# Patient Record
Sex: MALE | Race: WHITE | NOT HISPANIC OR LATINO | Employment: STUDENT | RURAL
[De-identification: names, ages, dates, MRNs, and addresses within clinical notes are randomized per-mention and may not be internally consistent; named-entity substitution may affect disease eponyms.]

---

## 2022-01-20 ENCOUNTER — OFFICE VISIT (OUTPATIENT)
Dept: FAMILY MEDICINE | Facility: CLINIC | Age: 22
End: 2022-01-20
Payer: COMMERCIAL

## 2022-01-20 VITALS
HEIGHT: 74 IN | RESPIRATION RATE: 20 BRPM | HEART RATE: 87 BPM | WEIGHT: 205 LBS | OXYGEN SATURATION: 98 % | BODY MASS INDEX: 26.31 KG/M2 | DIASTOLIC BLOOD PRESSURE: 74 MMHG | SYSTOLIC BLOOD PRESSURE: 118 MMHG

## 2022-01-20 DIAGNOSIS — R51.9 NONINTRACTABLE HEADACHE, UNSPECIFIED CHRONICITY PATTERN, UNSPECIFIED HEADACHE TYPE: ICD-10-CM

## 2022-01-20 DIAGNOSIS — R10.9 STOMACH PAIN: ICD-10-CM

## 2022-01-20 DIAGNOSIS — R10.10 UPPER ABDOMINAL PAIN: ICD-10-CM

## 2022-01-20 DIAGNOSIS — U07.1 COVID-19: Primary | ICD-10-CM

## 2022-01-20 DIAGNOSIS — R11.0 NAUSEA: ICD-10-CM

## 2022-01-20 DIAGNOSIS — R51.9 PERSISTENT HEADACHES: ICD-10-CM

## 2022-01-20 LAB
CTP QC/QA: YES
SARS-COV-2 AG RESP QL IA.RAPID: POSITIVE

## 2022-01-20 PROCEDURE — 99202 PR OFFICE/OUTPT VISIT, NEW, LEVL II, 15-29 MIN: ICD-10-PCS | Mod: ,,, | Performed by: NURSE PRACTITIONER

## 2022-01-20 PROCEDURE — 87426 SARS CORONAVIRUS 2 ANTIGEN POCT: ICD-10-PCS | Mod: QW,,, | Performed by: NURSE PRACTITIONER

## 2022-01-20 PROCEDURE — 99202 OFFICE O/P NEW SF 15 MIN: CPT | Mod: ,,, | Performed by: NURSE PRACTITIONER

## 2022-01-20 PROCEDURE — 87426 SARSCOV CORONAVIRUS AG IA: CPT | Mod: QW,,, | Performed by: NURSE PRACTITIONER

## 2022-01-20 NOTE — PATIENT INSTRUCTIONS
Patient Education       COVID-19 Discharge Instructions   About this topic   Coronavirus disease 2019 is also known as COVID-19. It is a viral illness that infects the lungs. It is caused by a virus called SARS-associated coronavirus (SARS-CoV-2).  The signs of COVID-19 most often start a few days after you have been infected. In some people, it takes longer to show signs. Others never show signs of the infection. You may have a cough, fever, shaking chills and it may be hard to breathe. You may be very tired, have muscle aches, a headache or sore throat. Some people have an upset stomach or loose stools. Others lose their sense of smell or taste. You may not have these signs all the time and they may come and go while you are sick.  The virus spreads easily through droplets when you talk, sneeze, or cough. You can pass the virus to others when you are talking close together, singing, hugging, sharing food, or shaking hands. Doctors believe the germs also survive on surfaces like tables, door handles, and telephones. However, this is not a common way that COVID-19 spreads. Doctors believe you can also spread the infection even if you dont have any symptoms, but they do not know how that happens. This is why getting vaccinated is one of the best ways to keep you healthy and slow the spread of the virus.  Some people have a mild case of COVID-19 and are able to stay at home and away from others until they feel better. Others may need to be in the hospital if they are very sick. Some people with COVID-19 can have some symptoms for weeks or months. People with COVID-19 must isolate themselves. You can start to be around others when your doctor says it is safe to do so.       What care is needed at home?   · Ask your doctor what you need to do when you go home. Make sure you ask questions if you do not understand what the doctor says.  · Drink lots of water, juice, or broth to replace fluids lost from a fever.  · You  may use cool mist humidifiers to help ease congestion and coughing.  · Use 2 to 3 pillows to prop yourself up when you lie down to make it easier to breathe and sleep.  · Do not smoke and do not drink beer, wine, and mixed drinks (alcohol).  · To lower the chance of passing the infection to others, get a COVID-19 vaccine after your infection has resolved.  · If you have not been fully vaccinated:  ? Wear a mask over your mouth and nose if you are around others who are not sick. Cloth masks work best if they have more than one layer of fabric.  ? Wash your hands often.  ? Stay home in a separate room, if possible, away from others. Only go out to get medical care.  ? Use a separate bathroom if possible.  ? Do not make food for others.  What follow-up care is needed?   · Your doctor may ask you to make visits to the office to check on your progress. Be sure to keep these visits. Make sure you wear a mask at these visits.  · If you can, tell the staff you have COVID-19 ahead of time so they can take extra care to stop the disease from spreading.  · It may take a few weeks before your health returns to normal.  What drugs may be needed?   The doctor may order drugs to:  · Help with breathing  · Help with fever  · Help with swelling in your airways and lungs  · Control coughing  · Ease a sore throat  · Help a runny or stuffy nose  Will physical activity be limited?   You may have to limit your physical activity. Talk to your doctor about the right amount of activity for you. If you have been very sick with COVID-19, it can take some time to get your strength back.  Will there be any other care needed?   Doctors do not know how long you can pass the virus on to others after you are sick. This is why it is important to stay in a separate room, if possible, when you are sick. For now, doctors are giving general guidelines for you to follow after you have been sick. Before you go around other people, you should:  · Be fever  free for 24 hours without taking any drugs to lower the fever  · Have no symptoms of cough or shortness of breath  · Wait at least 10 days after first having symptoms or your first positive test, and you need to be symptom free as above. Some experts suggest waiting 20 days if you have had a more severe infection.  Talk with your doctor about getting a COVID-19 vaccine.  What problems could happen?   · Fluid loss. This is dehydration.  · Short-term or long-term lung damage  · Heart problems  · Death  When do I need to call the doctor?   · You are having so much trouble breathing that you can only say one or two words at a time.  · You need to sit upright at all times to be able to breathe and/or cannot lie down.  · You are very confused or cannot stay awake.  · Your lips or skin start to turn blue or grey.  · You think you might be having a medical emergency. Some examples of medical emergencies are:  ? Severe chest pain.  ? Not able to speak or move normally.  · You have trouble breathing when talking or sitting still.  · You have new shortness of breath.  · You become weak or dizzy.  · You have very dark urine or do not pass urine for more than 8 hours.  · You have new or worsening COVID-19 symptoms like:  ? Fever  ? Cough  ? Feeling very tired  ? Shaking chills  ? Headache  ? Trouble swallowing  ? Throwing up  ? Loose stools  ? Reddish purple spots on your fingers or toes  Teach Back: Helping You Understand   The Teach Back Method helps you understand the information we are giving you. After you talk with the staff, tell them in your own words what you learned. This helps to make sure the staff has described each thing clearly. It also helps to explain things that may have been confusing. Before going home, make sure you can do these:  · I can tell you about my condition.  · I can tell you what may help ease my breathing.  · I can tell you what I can do to help avoid passing the infection to others.  · I can tell  you what I will do if I have trouble breathing; feel sleepy or confused; or my fingertips, fingernails, skin, or lips are blue.  Where can I learn more?   Centers for Disease Control and Prevention  https://www.cdc.gov/coronavirus/2019-ncov/about/index.html   Centers for Disease Control and Prevention  https://www.cdc.gov/coronavirus/2019-ncov/hcp/disposition-in-home-patients.html   World Health Organization  https://www.who.int/news-room/q-a-detail/e-c-wrwamcwelveee   Last Reviewed Date   2021-10-05  Consumer Information Use and Disclaimer   This information is not specific medical advice and does not replace information you receive from your health care provider. This is only a brief summary of general information. It does NOT include all information about conditions, illnesses, injuries, tests, procedures, treatments, therapies, discharge instructions or life-style choices that may apply to you. You must talk with your health care provider for complete information about your health and treatment options. This information should not be used to decide whether or not to accept your health care providers advice, instructions or recommendations. Only your health care provider has the knowledge and training to provide advice that is right for you.  Copyright   Copyright © 2021 [employer] and its affiliates and/or licensors. All rights reserved.

## 2022-01-20 NOTE — PROGRESS NOTES
[facility] - FAMILY [facility]IC[facility]       PATIENT NAME: Amado Martins   : 2000    AGE: 21 y.o. DATE: 2022    MRN: [id number]        Reason for Visit / Chief Complaint:  URI (Diarrhea, SOB, wheezing, chest pain, nonproductive cough, headaches, congestion, drainage, ear pain, loss of taste x 2 days. No fever. Unvaccinated. No known exposure.//Chest pain has been constant for the past 2-3 weeks.) and Abdominal Pain (Pt has been having abdominal pain and nausea since November. This was their main complaint today- States he was very stressed at school and is concerned about stomach ulcers.)     Subjective:     Presents with mom for car visit due to COVID-19 concerns and screening guidelines. Chief complaint reviewed. C/o upper resp symptoms as indicated in ROS x 2 days, but c/o upper abd pain, headaches (pressure behind eyes), and nausea x approx 2 mths. Stressed at school - tough classes at [employer] last semester. Started taking prilosec OTC 3 days ago - has appt here 22 for these complaints.    Review of Systems:    Review of Systems   Constitutional: Negative for appetite change, chills, fatigue and fever.   HENT: Positive for congestion, ear pain and postnasal drip. Negative for sinus pain and sore throat.    Respiratory: Positive for cough. Negative for shortness of breath and wheezing.    Cardiovascular: Negative for chest pain.   Gastrointestinal: Positive for abdominal pain, diarrhea and nausea. Negative for vomiting.   Musculoskeletal: Negative for myalgias.   Skin: Negative.    Neurological: Positive for headaches.        Review of patient's allergies indicates:  No Known Allergies     Med List:  No current outpatient medications on file prior to visit.     No current facility-administered medications on file prior to visit.       Medical/Social/Family History:  History reviewed. No pertinent past medical history.   Social History     Tobacco Use  "  Smoking Status Never Smoker   Smokeless Tobacco Never Used        There is no immunization history on file for this patient.     Objective:      Vitals:    01/20/22 1153   BP: 118/74   BP Location: Right arm   Patient Position: Sitting   BP Method: Large (Manual)   Pulse: 87   Resp: 20   SpO2: 98%   Weight: 93 kg (205 lb)  Comment: Pt estimated   Height: 6' 2" (1.88 m)     Body mass index is 26.32 kg/m².     Physical Exam:    Physical Exam  Vitals and nursing note reviewed.   Constitutional:       General: He is not in acute distress.     Appearance: Normal appearance. He is not ill-appearing.   HENT:      Head: Normocephalic.   Eyes:      Conjunctiva/sclera: Conjunctivae normal.   Cardiovascular:      Rate and Rhythm: Normal rate.   Pulmonary:      Effort: Pulmonary effort is normal. No respiratory distress.   Abdominal:      Tenderness: There is no abdominal tenderness.   Skin:     Coloration: Skin is not pale.   Neurological:      Mental Status: He is alert and oriented to person, place, and time.         Assessment:          ICD-10-CM ICD-9-CM   1. COVID-19  U07.1 079.89   2. Nonintractable headache, unspecified chronicity pattern, unspecified headache type  R51.9 784.0   3. Stomach pain  R10.9 536.8   4. Nausea  R11.0 787.02   5. Upper abdominal pain  R10.10 789.09   6. Persistent headaches  R51.9 784.0        Plan:       COVID-19    Nonintractable headache, unspecified chronicity pattern, unspecified headache type  -     SARS Coronavirus 2 Antigen, POCT    Stomach pain  -     SARS Coronavirus 2 Antigen, POCT    Nausea  -     SARS Coronavirus 2 Antigen, POCT    Upper abdominal pain    Persistent headaches      No current outpatient medications on file.      Requested Prescriptions      No prescriptions requested or ordered in this encounter       Rapid COVID POSITIVE     Advised to quarantine per updated CDC guidelines.  Advised COVID-19 is a virus and viruses do not respond to antibiotics nor do antibiotics " prevent a bacterial infection from developing.   Advised there is still some delta variant COVID-19 circulating, but the Omicron variant seems to be rapidly increasing in the area and tends to have milder symptoms that are shorter in duration.     Advised of the nationwide shortage of COVID MABs, and the very limited supply for very high risk patient's only.   Risk of COVID-19 Complications score: 1     Treatment for COVID-19 is primarily supportive including rest, increase fluids, and the use of OTC meds to help alleviate symptoms.    For immune support take vitamin C, vitamin D, and zinc 50 mg daily.  Acetaminophen or ibuprofen as needed for body aches, headache, or fever.  Home O2 sat monitoring can be used if experiencing shortness of breath.   Advised to report to ED for evaluation of any new or worsening chest pain, shortness of breath, or other severe symptoms.  Call the office if you have other questions or concerns that arise.    Stay on prilosec OTC and keep appt 2/4/22 as scheduled.  F/u as needed or if symptoms worsen or persist.    Signature: Yulia RICE-BC

## 2022-01-20 NOTE — LETTER
January 20, 2022      [facility] - Family Medicine  [address] [place], [place] [address]E [facility]  [place] [address]  Phone: [phone]  Fax: [phone]       Patient: Amado Martins   YOB: 2000  Date of Visit: 01/20/2022    To Whom It May Concern:    Toy Martins  was at [facility] on 01/20/2022. The patient may return to work/school on 01/25/2022 with no restrictions. If you have any questions or concerns, or if I can be of further assistance, please do not hesitate to contact me.    Sincerely,    KRYSTAL Moncada

## 2022-02-08 ENCOUNTER — OFFICE VISIT (OUTPATIENT)
Dept: FAMILY MEDICINE | Facility: CLINIC | Age: 22
End: 2022-02-08
Payer: COMMERCIAL

## 2022-02-08 VITALS
HEART RATE: 76 BPM | RESPIRATION RATE: 20 BRPM | SYSTOLIC BLOOD PRESSURE: 124 MMHG | HEIGHT: 74 IN | TEMPERATURE: 98 F | DIASTOLIC BLOOD PRESSURE: 72 MMHG | WEIGHT: 208.81 LBS | OXYGEN SATURATION: 98 % | BODY MASS INDEX: 26.8 KG/M2

## 2022-02-08 DIAGNOSIS — R53.83 OTHER FATIGUE: ICD-10-CM

## 2022-02-08 DIAGNOSIS — Z13.220 SCREENING FOR HYPERLIPIDEMIA: ICD-10-CM

## 2022-02-08 DIAGNOSIS — Z11.59 NEED FOR HEPATITIS C SCREENING TEST: ICD-10-CM

## 2022-02-08 DIAGNOSIS — R63.5 WEIGHT GAIN: ICD-10-CM

## 2022-02-08 DIAGNOSIS — R10.10 UPPER ABDOMINAL PAIN: ICD-10-CM

## 2022-02-08 DIAGNOSIS — Z00.00 ROUTINE GENERAL MEDICAL EXAMINATION AT A HEALTH CARE FACILITY: Primary | ICD-10-CM

## 2022-02-08 PROBLEM — U07.1 COVID-19: Status: RESOLVED | Noted: 2022-01-20 | Resolved: 2022-02-08

## 2022-02-08 PROCEDURE — 3008F PR BODY MASS INDEX (BMI) DOCUMENTED: ICD-10-PCS | Mod: ,,, | Performed by: NURSE PRACTITIONER

## 2022-02-08 PROCEDURE — 1159F MED LIST DOCD IN RCRD: CPT | Mod: ,,, | Performed by: NURSE PRACTITIONER

## 2022-02-08 PROCEDURE — 3074F PR MOST RECENT SYSTOLIC BLOOD PRESSURE < 130 MM HG: ICD-10-PCS | Mod: ,,, | Performed by: NURSE PRACTITIONER

## 2022-02-08 PROCEDURE — 3008F BODY MASS INDEX DOCD: CPT | Mod: ,,, | Performed by: NURSE PRACTITIONER

## 2022-02-08 PROCEDURE — 99395 PR PREVENTIVE VISIT,EST,18-39: ICD-10-PCS | Mod: ,,, | Performed by: NURSE PRACTITIONER

## 2022-02-08 PROCEDURE — 3078F PR MOST RECENT DIASTOLIC BLOOD PRESSURE < 80 MM HG: ICD-10-PCS | Mod: ,,, | Performed by: NURSE PRACTITIONER

## 2022-02-08 PROCEDURE — 3078F DIAST BP <80 MM HG: CPT | Mod: ,,, | Performed by: NURSE PRACTITIONER

## 2022-02-08 PROCEDURE — 3074F SYST BP LT 130 MM HG: CPT | Mod: ,,, | Performed by: NURSE PRACTITIONER

## 2022-02-08 PROCEDURE — 99395 PREV VISIT EST AGE 18-39: CPT | Mod: ,,, | Performed by: NURSE PRACTITIONER

## 2022-02-08 PROCEDURE — 1159F PR MEDICATION LIST DOCUMENTED IN MEDICAL RECORD: ICD-10-PCS | Mod: ,,, | Performed by: NURSE PRACTITIONER

## 2022-02-08 RX ORDER — OMEPRAZOLE 20 MG/1
20 CAPSULE, DELAYED RELEASE ORAL DAILY
COMMUNITY
End: 2022-05-04 | Stop reason: DRUGHIGH

## 2022-02-08 NOTE — PATIENT INSTRUCTIONS
Patient Education       Yearly Physical for Adults   About this topic   Most people do not want to be sick. Having a checkup each year with your doctor is one way to help you stay healthy. You may need to see your doctor more or less often. How often you need to go to the doctor depends on your age. Your family and medical history also play a role in how often you need to go to the doctor. Going to see your doctor on a routine basis can help you find problems early or even before they start. This may make it easier to treat or cure your problem.  General   Your doctor will talk about many things during your checkup. Your doctor may ask about:  · Your medical and family history.  · All the drugs you are taking. Be sure to include all prescription, over the counter, and herbal supplements. Tell the doctor if you have any drug allergy. Bring a list of drugs you take with you.  · How you are feeling and if you are having any problems.  · Risky behaviors like smoking, drinking alcohol, using illegal drugs, not wearing seatbelts, having unprotected sex, etc.  Your doctor will do a physical exam and may check your:  · Height and weight  · Blood pressure  · Reflexes  · Memory  · Vision  · Hearing  Your doctor may order:  · Lab tests  · ECG to check your heart rhythm  · X-rays  · Tests or treatments based on your exam  What lifestyle changes are needed?   Your doctor may suggest you make changes to your lifestyle at this visit. The doctor may talk with you about being more active or lowering stress levels. Ask your doctor what you need to do.  What drugs may be needed?   Your doctor may order drugs or vaccines to protect you from illnesses.  What changes to diet are needed?   Talk to your doctor to see if any changes are needed to your diet.  When do I need to call the doctor?   Call your doctor if you need to learn about any test results. Together you can make a plan for more care.  Helpful tips   · Make a list of questions  for your doctor before you go. This will help you remember to ask about any concerns. Write down any answers from your doctor so you can look over them after your visit.   · Tell your doctor about any changes in your body or health since your last visit.  · Ask your doctor about any screening tests you need.  Where can I learn more?   American Academy of Family Physicians  http://familydoctor.org/familydoctor/en/prevention-wellness/staying-healthy/healthy-living/preventive-services-for-healthy-living.printerview.html   Centers for Disease Control  http://www.cdc.gov/family/checkup/   Last Reviewed Date   2019-04-22  Consumer Information Use and Disclaimer   This information is not specific medical advice and does not replace information you receive from your health care provider. This is only a brief summary of general information. It does NOT include all information about conditions, illnesses, injuries, tests, procedures, treatments, therapies, discharge instructions or life-style choices that may apply to you. You must talk with your health care provider for complete information about your health and treatment options. This information should not be used to decide whether or not to accept your health care providers advice, instructions or recommendations. Only your health care provider has the knowledge and training to provide advice that is right for you.  Copyright   Copyright © 2021 [employer] and its affiliates and/or licensors. All rights reserved.

## 2022-02-08 NOTE — PROGRESS NOTES
"   [facility] - FAMILY MEDICINE       PATIENT NAME: Amado Martins   : 2000    AGE: 21 y.o. DATE: 02/15/2022    MRN: [id number]        Reason for Visit / Chief Complaint:  Annual Exam (Pt presents for [facility]. He is not fasting.)     Subjective:     HPI:  Presents to establish care/healthy you. Will RTC Thurs am for fasting labs.  Positive for COVID 2.5 weeks ago.    Was previously having epigastric pain that radiated into his chest and nausea, but reports that has improved since starting omeprazole 20 mg about 3 weeks ago.   Now just some nausea in the mornings that resolves without treatment as he gets busy and doesn't notice it anymore.   Still has some random discomfort to upper abd - RUQ and LUQ at times.    Was having diarrhea which stopped 2 weeks ago and now having a normal daily BM.    Had some liquor over the weekend and his low back began aching. Low back pain on R side flares up off and on.    ETOH 3-4 times per week - usually 3-4 beer, but occasionally liquor   No substance abuse.    Headaches - started in November, minor at first, last week headaches were "terrible", but improved  and only mild discomfort for 5 minutes yesterday and today.  "brain fog" and fatigue x approx 3 mths  20 lb wt gain in past 2-3 mths    Thought maybe s/s were stress related, but denies anxiety and depression.    Dark circles under eyes whole life    PHQ9 2022   Total Score 5       Review of Systems:     Review of Systems   Constitutional: Positive for fatigue and unexpected weight change. Negative for appetite change, chills and fever.   HENT: Negative.    Eyes:        Dry eyes  No contacts or glasses.   Respiratory: Negative.    Cardiovascular: Negative.    Gastrointestinal: Positive for abdominal pain and nausea. Negative for blood in stool, constipation, diarrhea and vomiting.   Genitourinary: Positive for frequency. Negative for dysuria, penile " discharge, penile swelling, scrotal swelling and testicular pain.   Skin: Negative.    Neurological: Positive for headaches.   Psychiatric/Behavioral: Negative.  Negative for dysphoric mood. The patient is not nervous/anxious.        Allergies and Medications:     Review of patient's allergies indicates:  No Known Allergies     Current Outpatient Medications on File Prior to Visit   Medication Sig Dispense Refill    omeprazole (PRILOSEC) 20 MG capsule Take 20 mg by mouth once daily.       No current facility-administered medications on file prior to visit.       Labs:   None in Epic prior to today's visit.    Medical/Social/Family History:     Past Medical History:   Diagnosis Date    COVID-19 1/20/2022    GERD (gastroesophageal reflux disease)       Social History     Tobacco Use   Smoking Status Never Smoker   Smokeless Tobacco Never Used      Social History     Substance and Sexual Activity   Alcohol Use Yes       Family History   Problem Relation Age of Onset    No Known Problems Mother     GI problems Father     No Known Problems Maternal Grandmother     No Known Problems Maternal Grandfather     No Known Problems Paternal Grandmother     No Known Problems Paternal Grandfather       Past Surgical History:   Procedure Laterality Date    BONE GRAFT      TONSILLECTOMY          Health Maintenance:     Immunization History   Administered Date(s) Administered    DTaP 02/25/1999, 05/04/1999, 07/07/1999, 02/12/2003    DTaP / HiB 03/24/2000    Hep B / HiB 05/04/1999    Hepatitis B, Pediatric/Adolescent 02/25/1999, 07/07/1999    HiB PRP-OMP 02/25/1999    IPV 02/25/1999, 05/04/1999, 03/24/2000, 02/12/2003    MMR 03/24/2000, 02/12/2003    Meningococcal Conjugate (MCV4P) 10/19/2018    Tdap 10/19/2018    Varicella 03/24/2000      Health Maintenance Due   Topic Date Due    Eye Exam  Never done    HPV Vaccines (1 - Male 2-dose series) Never done     Health Maintenance Topics with due status: Not Due     "   Topic Last Completion Date    TETANUS VACCINE 10/19/2018       Objective:      Vitals:    02/08/22 1349   BP: 124/72   BP Location: Right arm   Patient Position: Sitting   BP Method: Large (Manual)   Pulse: 76   Resp: 20   Temp: 98.3 °F (36.8 °C)   TempSrc: Temporal   SpO2: 98%   Weight: 94.7 kg (208 lb 12.8 oz)   Height: 6' 2" (1.88 m)     Body mass index is 26.81 kg/m².     Physical Exam:    Physical Exam  Vitals and nursing note reviewed.   Constitutional:       Appearance: Normal appearance.   HENT:      Head: Normocephalic.      Right Ear: Tympanic membrane, ear canal and external ear normal.      Left Ear: Tympanic membrane, ear canal and external ear normal.      Nose: Nose normal.      Mouth/Throat:      Mouth: Mucous membranes are moist.      Pharynx: Oropharynx is clear.   Eyes:      Conjunctiva/sclera: Conjunctivae normal.      Pupils: Pupils are equal, round, and reactive to light.   Neck:      Thyroid: No thyromegaly.      Vascular: Normal carotid pulses. No carotid bruit.   Cardiovascular:      Rate and Rhythm: Normal rate and regular rhythm.      Pulses: Normal pulses.      Heart sounds: Normal heart sounds.   Pulmonary:      Effort: Pulmonary effort is normal.      Breath sounds: Normal breath sounds.   Abdominal:      General: Bowel sounds are normal.      Palpations: Abdomen is soft.      Tenderness: There is no abdominal tenderness.   Musculoskeletal:      Cervical back: Neck supple.      Right lower leg: No edema.      Left lower leg: No edema.   Lymphadenopathy:      Cervical: No cervical adenopathy.   Skin:     General: Skin is warm and dry.   Neurological:      General: No focal deficit present.      Mental Status: He is alert and oriented to person, place, and time.   Psychiatric:         Mood and Affect: Mood normal.         Behavior: Behavior normal.          Assessment:          ICD-10-CM ICD-9-CM   1. Routine general medical examination at a health care facility  Z00.00 V70.0   2. " Screening for hyperlipidemia  Z13.220 V77.91   3. Need for hepatitis C screening test  Z11.59 V73.89   4. Other fatigue  R53.83 780.79   5. Weight gain  R63.5 783.1   6. Upper abdominal pain  R10.10 789.09        Plan:       Routine general medical examination at a health care facility    Screening for hyperlipidemia  -     Lipid Panel; Future; Expected date: 02/10/2022    Need for hepatitis C screening test  -     Hepatitis C Antibody; Future; Expected date: 02/10/2022    Other fatigue  -     CBC Auto Differential; Future; Expected date: 02/10/2022  -     Comprehensive Metabolic Panel; Future; Expected date: 02/10/2022  -     TSH; Future; Expected date: 02/10/2022    Weight gain  -     CBC Auto Differential; Future; Expected date: 02/10/2022  -     Comprehensive Metabolic Panel; Future; Expected date: 02/10/2022  -     TSH; Future; Expected date: 02/10/2022    Upper abdominal pain  -     CBC Auto Differential; Future; Expected date: 02/10/2022  -     Comprehensive Metabolic Panel; Future; Expected date: 02/10/2022        Current Outpatient Medications:     omeprazole (PRILOSEC) 20 MG capsule, Take 20 mg by mouth once daily., Disp: , Rfl:       New & refilled meds:  Requested Prescriptions      No prescriptions requested or ordered in this encounter     Health goals to improve overall health and well-being:  healthy diet, exercise at least 5 days per week  fasting glucose < 100  LDL chol < 100    Additional labs for work-up of other s/s then further diagnostic studies if needed or if s/s persist.  Continue omeprazole 20 mg daily.     Encourage healthy diet, exercise, approx 100 oz water daily.    Next HY in 1 yr.   F/u for further eval if labs ok but s/s persist.    Future Appointments   Date Time Provider Department Center   2/15/2022  1:20 PM KRYSTAL Powers [facility]   2/13/2023  8:00 AM KRYSTAL Powers [facility]        Signature:  KRYSTAL PowersH  [facility] - FAMILY MEDICINE    Date of encounter: 2/8/22

## 2022-02-15 ENCOUNTER — HOSPITAL ENCOUNTER (OUTPATIENT)
Dept: RADIOLOGY | Facility: HOSPITAL | Age: 22
Discharge: HOME | End: 2022-02-15
Attending: NURSE PRACTITIONER
Payer: COMMERCIAL

## 2022-02-15 ENCOUNTER — OFFICE VISIT (OUTPATIENT)
Dept: FAMILY MEDICINE | Facility: CLINIC | Age: 22
End: 2022-02-15
Payer: COMMERCIAL

## 2022-02-15 VITALS
TEMPERATURE: 99 F | WEIGHT: 209.38 LBS | RESPIRATION RATE: 20 BRPM | DIASTOLIC BLOOD PRESSURE: 78 MMHG | HEIGHT: 74 IN | HEART RATE: 68 BPM | OXYGEN SATURATION: 98 % | BODY MASS INDEX: 26.87 KG/M2 | SYSTOLIC BLOOD PRESSURE: 120 MMHG

## 2022-02-15 DIAGNOSIS — R11.0 NAUSEA: ICD-10-CM

## 2022-02-15 DIAGNOSIS — R10.10 UPPER ABDOMINAL PAIN: ICD-10-CM

## 2022-02-15 DIAGNOSIS — R10.10 UPPER ABDOMINAL PAIN: Primary | ICD-10-CM

## 2022-02-15 DIAGNOSIS — R51.9 PERSISTENT HEADACHES: ICD-10-CM

## 2022-02-15 PROCEDURE — 74018 XR ABDOMEN AP 1 VIEW: ICD-10-PCS | Mod: 26,,, | Performed by: RADIOLOGY

## 2022-02-15 PROCEDURE — 3008F PR BODY MASS INDEX (BMI) DOCUMENTED: ICD-10-PCS | Mod: ,,, | Performed by: NURSE PRACTITIONER

## 2022-02-15 PROCEDURE — 3078F DIAST BP <80 MM HG: CPT | Mod: ,,, | Performed by: NURSE PRACTITIONER

## 2022-02-15 PROCEDURE — 3078F PR MOST RECENT DIASTOLIC BLOOD PRESSURE < 80 MM HG: ICD-10-PCS | Mod: ,,, | Performed by: NURSE PRACTITIONER

## 2022-02-15 PROCEDURE — 74018 RADEX ABDOMEN 1 VIEW: CPT | Mod: TC

## 2022-02-15 PROCEDURE — 3008F BODY MASS INDEX DOCD: CPT | Mod: ,,, | Performed by: NURSE PRACTITIONER

## 2022-02-15 PROCEDURE — 99213 OFFICE O/P EST LOW 20 MIN: CPT | Mod: ,,, | Performed by: NURSE PRACTITIONER

## 2022-02-15 PROCEDURE — 74018 RADEX ABDOMEN 1 VIEW: CPT | Mod: 26,,, | Performed by: RADIOLOGY

## 2022-02-15 PROCEDURE — 99213 PR OFFICE/OUTPT VISIT, EST, LEVL III, 20-29 MIN: ICD-10-PCS | Mod: ,,, | Performed by: NURSE PRACTITIONER

## 2022-02-15 PROCEDURE — 1159F MED LIST DOCD IN RCRD: CPT | Mod: ,,, | Performed by: NURSE PRACTITIONER

## 2022-02-15 PROCEDURE — 1159F PR MEDICATION LIST DOCUMENTED IN MEDICAL RECORD: ICD-10-PCS | Mod: ,,, | Performed by: NURSE PRACTITIONER

## 2022-02-15 PROCEDURE — 3074F PR MOST RECENT SYSTOLIC BLOOD PRESSURE < 130 MM HG: ICD-10-PCS | Mod: ,,, | Performed by: NURSE PRACTITIONER

## 2022-02-15 PROCEDURE — 3074F SYST BP LT 130 MM HG: CPT | Mod: ,,, | Performed by: NURSE PRACTITIONER

## 2022-02-15 NOTE — PROGRESS NOTES
[facility] - FAMILY MEDICINE       PATIENT NAME: Amado Martins   : 2000    AGE: 21 y.o. DATE: 02/15/2022    MRN: [id number]        Reason for Visit / Chief Complaint:  Follow-up (Pt presents for follow up on headaches. No change since he was last seen they are just not better. He wants further evaluation.)     Subjective:     HPI:  Presents for c/o persistent headaches since 2021. Headaches had improved at [facility] visit 1 week ago.    Headaches were minor at first but progressively worsening.  Location varies, sometimes unilateral and other times bilateral; often in temples described as a real tight squeeze for a few minutes and otherwise constant dull discomfort all day nearly every day.  Described as a squeezing and dull discomfort.  Associated symptoms: sensitivity to light, nausea  Timing: wakes up with headache daily  Last eye exam years ago  Has not taken any OTC pain relievers for headache.     Still having discomfort to upper abdomen - location varies RUQ, LUQ, and epigastric. Nausea but no vomiting.   Normal daily BMs.  Hasn't been able to associate precipitating factors.  Taking omeprazole daily and hasn't had any more chest pain.    PHQ9 2022   Total Score 5       Review of Systems:     Review of Systems   Constitutional: Positive for fatigue. Negative for appetite change, chills, fever and unexpected weight change.   HENT: Negative.    Eyes:        Dry eyes  No contacts or glasses.   Respiratory: Negative.    Cardiovascular: Negative.    Gastrointestinal: Positive for abdominal pain and nausea. Negative for blood in stool, constipation, diarrhea and vomiting.   Genitourinary: Negative.    Skin: Negative.    Neurological: Positive for headaches.   Psychiatric/Behavioral: Negative.  Negative for dysphoric mood. The patient is not nervous/anxious.        Allergies and Medications:     Review of patient's allergies indicates:  No  Known Allergies     Current Outpatient Medications on File Prior to Visit   Medication Sig Dispense Refill    omeprazole (PRILOSEC) 20 MG capsule Take 20 mg by mouth once daily.       No current facility-administered medications on file prior to visit.       Labs:      Lipids:   Lab Results   Component Value Date    CHOL 193 02/10/2022     Lab Results   Component Value Date    HDL 46 02/10/2022     Lab Results   Component Value Date    LDLCALC 122 02/10/2022     Lab Results   Component Value Date    TRIG 123 02/10/2022     Lab Results   Component Value Date    CHOLHDL 4.2 02/10/2022       CMP:  Sodium   Date Value Ref Range Status   02/10/2022 141 136 - 145 mmol/L Final     Potassium   Date Value Ref Range Status   02/10/2022 4.1 3.5 - 5.1 mmol/L Final     Chloride   Date Value Ref Range Status   02/10/2022 106 98 - 107 mmol/L Final     CO2   Date Value Ref Range Status   02/10/2022 29 21 - 32 mmol/L Final     Glucose   Date Value Ref Range Status   02/10/2022 93 74 - 106 mg/dL Final     BUN   Date Value Ref Range Status   02/10/2022 14 7 - 18 mg/dL Final     Creatinine   Date Value Ref Range Status   02/10/2022 0.83 0.70 - 1.30 mg/dL Final     Calcium   Date Value Ref Range Status   02/10/2022 9.0 8.5 - 10.1 mg/dL Final     Total Protein   Date Value Ref Range Status   02/10/2022 7.3 6.4 - 8.2 g/dL Final     Albumin   Date Value Ref Range Status   02/10/2022 4.2 3.5 - 5.0 g/dL Final     Bilirubin, Total   Date Value Ref Range Status   02/10/2022 0.7 0.0 - 1.2 mg/dL Final     Alk Phos   Date Value Ref Range Status   02/10/2022 44 (L) 45 - 115 U/L Final     AST   Date Value Ref Range Status   02/10/2022 14 (L) 15 - 37 U/L Final     ALT   Date Value Ref Range Status   02/10/2022 27 16 - 61 U/L Final     Anion Gap   Date Value Ref Range Status   02/10/2022 10 7 - 16 mmol/L Final     eGFR   Date Value Ref Range Status   02/10/2022 122 >=60 mL/min/1.73m² Final      CBC:  Lab Results   Component Value Date    WBC 4.65  02/10/2022    RBC 5.13 02/10/2022    HGB 15.4 02/10/2022    HCT 45.5 02/10/2022    MCV 88.7 02/10/2022    MCH 30.0 02/10/2022    MCHC 33.8 02/10/2022    RDW 12.4 02/10/2022     02/10/2022    MPV 11.9 02/10/2022    LYMPH 42.8 (H) 02/10/2022    LYMPH 1.99 02/10/2022    MONO 10.5 (H) 02/10/2022    EOS 0.04 02/10/2022    BASO 0.01 02/10/2022    EOSINOPHIL 0.9 (L) 02/10/2022    BASOPHIL 0.2 02/10/2022      Lab Results   Component Value Date    TSH 2.390 02/10/2022       Medical/Social/Family History:     Past Medical History:   Diagnosis Date    COVID-19 1/20/2022    GERD (gastroesophageal reflux disease)       Social History     Tobacco Use   Smoking Status Never Smoker   Smokeless Tobacco Never Used      Social History     Substance and Sexual Activity   Alcohol Use Yes       Family History   Problem Relation Age of Onset    No Known Problems Mother     GI problems Father     No Known Problems Maternal Grandmother     No Known Problems Maternal Grandfather     No Known Problems Paternal Grandmother     No Known Problems Paternal Grandfather       Past Surgical History:   Procedure Laterality Date    BONE GRAFT      TONSILLECTOMY          Health Maintenance:     Immunization History   Administered Date(s) Administered    DTaP 02/25/1999, 05/04/1999, 07/07/1999, 02/12/2003    DTaP / HiB 03/24/2000    Hep B / HiB 05/04/1999    Hepatitis B, Pediatric/Adolescent 02/25/1999, 07/07/1999    HiB PRP-OMP 02/25/1999    IPV 02/25/1999, 05/04/1999, 03/24/2000, 02/12/2003    MMR 03/24/2000, 02/12/2003    Meningococcal Conjugate (MCV4P) 10/19/2018    Tdap 10/19/2018    Varicella 03/24/2000      Health Maintenance Due   Topic Date Due    Eye Exam  Never done    HPV Vaccines (1 - Male 2-dose series) Never done     Health Maintenance Topics with due status: Not Due       Topic Last Completion Date    TETANUS VACCINE 10/19/2018       Objective:      Vitals:    02/15/22 1319   BP: 120/78   BP Location: Left  "arm   Patient Position: Sitting   BP Method: Large (Manual)   Pulse: 68   Resp: 20   Temp: 98.5 °F (36.9 °C)   TempSrc: Temporal   SpO2: 98%   Weight: 95 kg (209 lb 6.4 oz)   Height: 6' 2" (1.88 m)     Body mass index is 26.89 kg/m².     Physical Exam:    Physical Exam  Vitals and nursing note reviewed.   Constitutional:       Appearance: Normal appearance.   HENT:      Head: Normocephalic.      Right Ear: Tympanic membrane, ear canal and external ear normal.      Left Ear: Tympanic membrane, ear canal and external ear normal.      Nose: Nose normal.      Mouth/Throat:      Mouth: Mucous membranes are moist.      Pharynx: Oropharynx is clear.   Neck:      Thyroid: No thyromegaly.      Vascular: Normal carotid pulses.   Cardiovascular:      Rate and Rhythm: Normal rate and regular rhythm.      Pulses: Normal pulses.      Heart sounds: Normal heart sounds.   Pulmonary:      Effort: Pulmonary effort is normal.      Breath sounds: Normal breath sounds.   Abdominal:      Palpations: Abdomen is soft.      Tenderness: There is no abdominal tenderness.   Musculoskeletal:      Cervical back: Neck supple.   Lymphadenopathy:      Cervical: No cervical adenopathy.   Skin:     General: Skin is warm and dry.   Neurological:      Mental Status: He is alert and oriented to person, place, and time.   Psychiatric:         Mood and Affect: Mood normal.         Behavior: Behavior normal.          Assessment:          ICD-10-CM ICD-9-CM   1. Upper abdominal pain  R10.10 789.09   2. Persistent headaches  R51.9 784.0        Plan:       Upper abdominal pain  -     X-Ray Abdomen AP 1 View; Future; Expected date: 02/15/2022    Persistent headaches  Comments:  since November 2021  Orders:  -     MRI Brain Without Contrast; Future; Expected date: 02/15/2022        Current Outpatient Medications:     omeprazole (PRILOSEC) 20 MG capsule, Take 20 mg by mouth once daily., Disp: , Rfl:       New & refilled meds:  Requested Prescriptions      No " prescriptions requested or ordered in this encounter     Tues and Thursdays are best days for scheduling appts.    MRI brain for further eval of daily headaches x 3 mths    KUB at [facility]. If negative, will order US upper abd/gallbladder.  Continue omeprazole.    Return to clinic as needed.    Future Appointments   Date Time Provider Department Center   2/15/2022  2:15 PM [facility]   2/13/2023  8:00 AM KRYSTAL Powers [facility]        Signature:  [facility]    Date of encounter: 2/15/22  Reviewed by ANGELIA Estrada MD 4/1/22

## 2022-02-15 NOTE — PATIENT INSTRUCTIONS
Patient Education       Headache, Adult   About this topic   Headache is the word used to describe aching or pain in the head. There are many types of headaches. Some of them are:  · Headaches that are from an illness or injury. These may be caused from a virus or other infection. They can also happen when you do not get enough to drink.  · Tension headaches may cause mild to moderate pain. The pain may feel like it is squeezing, pressure, dull, or aching. You may have pain in the front, back, or both sides of head. Tension headaches are not often bad enough to keep you from doing daily activities. Some people may not feel like doing anything while they have the headache. Tension headaches can last from 30 minutes to 7 days.  · Migraine headaches may cause moderate to severe pain. The pain may throb on one or both sides of the head. These headaches often start off mild and get worse. You are often not able to do normal activities. This kind of headache may also have other signs with it like throwing up and not being able to be around light or sound.  · Cluster headaches are severe and happen again and again but for short periods of time. The pain is burning, sharp, and keeps hurting. The pain may happen behind or around your eye. It can also be on one side of your face. Signs can include a stuffed, runny nose and red, watery eye on the side of pain. They can happen because of drinking alcohol, smoking, heat, and bright lights. Some drugs can also cause this type of headache.  · A sinus headache is often either a migraine or tension headache. Sinusitis should not cause repeat headaches. If you have pain over your nose or sinuses, a fever and thick liquid coming from your nose, you may have a sinus infection.  · Medication overuse headaches can happen if you have had many headaches and have taken headache medicine to help with them.  Not all headaches need to be checked by a doctor. Some kinds may be a sign of a  serious problem. Care for headaches will depend on what is causing them.  What are the causes?   · The exact cause of many headaches is not known and may be different for each kind of headache.  What are the main signs?   Signs are different for each kind of headache. Talk with your doctor about your signs.  How does the doctor diagnose this health problem?   Your doctor will take your history and do an exam. You will be asked to talk about your headaches. The doctor will want to know when they happen and how often you get them. It may be hard to find the exact cause. Your doctor may order:  · Lab tests  · CT or MRI scan  · Other tests to check for health problems that could be causing the headaches  Your doctor may also suggest you keep a headache diary to try to see if there is a pattern to your headaches.  How does the doctor treat this health problem?   Goals are to stop or lower the number of headaches, help ease your pain, and give you a better quality of life. The type of care that is best for you will depend on the type of headache you have. This may be:  · Self-care during the headache:  ? Cold compresses or ice packs  ? Massaging temples and neck  ? Deep breathing techniques  ? Warm shower  ? Sleeping  ? Drugs that don't need to be ordered by a doctor like acetaminophen or ibuprofen  ? Avoiding possible triggers     · Other care may be:  ? Acupuncture  ? Physical therapy  ? Massage if the pain is in the neck and shoulders  ? Botox  What lifestyle changes are needed?   Some things can lessen the chance for you to get a headache.  · You can take drugs like acetaminophen, ibuprofen, or naproxen for pain as instructed, but use of these pain medicines should be limited. If you need to take pain medicines every day for headaches, call your doctor.  · If possible, lie down in a quiet, dark room.  · Make sure you eat at regular times. Do not skip meals. Drink plenty of fluids. Be sure you are getting enough  "sleep.  · If you have frequent headaches that interfere with your activities, you can keep a "headache diary." This might help to see if there is a pattern to your headaches. Make notes about:  ? Where your pain is on your head or neck.  ? When you have the pain and how long it lasts.  ? How your pain feels. Is it dull, sharp, burning, stabbing, or cramping?  ? What causes your pain?  ? What makes your pain better or worse?  What drugs may be needed?   Your doctor may order drugs based on the type of headache you have. The doctor may order drugs to:  · Help with pain  · Prevent or stop the headache  · Treat upset stomach and throwing up  · Treat high blood pressure  · Treat low mood  · Treat hormonal imbalance  What problems could happen?   Headache may be part of a more serious health problem.  What can be done to prevent this health problem?   · Know the things that may start your headache.  · Don't spend too much time in front of screens. This includes watching TV, using computers, and playing video games.  · Take drugs to keep from getting headaches. Your doctor may give you drugs to lower how long the headache lasts. This can also help lower how long you will have your headache.  · Hold the phone rather than resting it on your shoulder, or use a headset.  · Maintain good posture and exercise regularly to keep back, shoulder, and neck muscles strong.  Where can I learn more?   American Academy of Family Physicians  http://familydoctor.org/familydoctor/en/diseases-conditions/headaches.html   [employer] Neu[employer] Disorders and Stroke  https://www.ninds.nih.gov/Disorders/All-Disorders/Headache-Information-Page   NHS Choices  http://www.nhs.uk/conditions/headache/Pages/Introduction.aspx   Last Reviewed Date   2021-06-16  Consumer Information Use and Disclaimer   This information is not specific medical advice and does not replace information you receive from your health care provider. This is only a " brief summary of general information. It does NOT include all information about conditions, illnesses, injuries, tests, procedures, treatments, therapies, discharge instructions or life-style choices that may apply to you. You must talk with your health care provider for complete information about your health and treatment options. This information should not be used to decide whether or not to accept your health care providers advice, instructions or recommendations. Only your health care provider has the knowledge and training to provide advice that is right for you.  Copyright   Copyright © 2021 [employer] and its affiliates and/or licensors. All rights reserved.

## 2022-03-03 ENCOUNTER — HOSPITAL ENCOUNTER (OUTPATIENT)
Dept: RADIOLOGY | Facility: HOSPITAL | Age: 22
Discharge: HOME | End: 2022-03-03
Attending: NURSE PRACTITIONER
Payer: COMMERCIAL

## 2022-03-03 DIAGNOSIS — R10.10 UPPER ABDOMINAL PAIN: ICD-10-CM

## 2022-03-03 DIAGNOSIS — R11.0 NAUSEA: ICD-10-CM

## 2022-03-03 PROCEDURE — 76705 ECHO EXAM OF ABDOMEN: CPT | Mod: TC

## 2022-03-03 PROCEDURE — 76705 ECHO EXAM OF ABDOMEN: CPT | Mod: 26,,, | Performed by: RADIOLOGY

## 2022-03-03 PROCEDURE — 76705 US ABDOMEN LIMITED: ICD-10-PCS | Mod: 26,,, | Performed by: RADIOLOGY

## 2022-03-07 DIAGNOSIS — R51.9 PERSISTENT HEADACHES: Primary | ICD-10-CM

## 2022-03-07 RX ORDER — AMITRIPTYLINE HYDROCHLORIDE 25 MG/1
25 TABLET, FILM COATED ORAL NIGHTLY
Qty: 90 TABLET | Refills: 0 | Status: SHIPPED | OUTPATIENT
Start: 2022-03-07 | End: 2022-05-04 | Stop reason: DRUGHIGH

## 2022-03-08 NOTE — PROGRESS NOTES
His MRI brain is normal. I can start him on a preventive med for chronic headaches such as amitriptyline 25 mg at bedtime which might also incidentally help with his upper abd discomfort then have him f/u with me in 8 weeks, or refer him to neuro for headaches x 4 mths if he prefers. I also sent you his US results. ARNOLDO RICE    Spoke to pt. He voiced understanding. He wants to see if the medication is going to help. He states that he has felt pretty bad lately. He will let us know if the med does not work and we can enter the referral. JEANNA Shaw LPN    Make sure he gets f/u visit for 8 weeks - chronic headaches, f/u start of new med.

## 2022-03-11 NOTE — PROGRESS NOTES
[facility] - FAMILY MEDICINE       PATIENT NAME: Amado Martins   : 2000    AGE: 21 y.o. DATE: 2022    MRN: [id number]        Reason for Visit / Chief Complaint:  URI (Diarrhea, SOB, wheezing, chest pain, nonproductive cough, headaches, congestion, drainage, ear pain, loss of taste x 2 days. No fever. Unvaccinated. No known exposure.//Chest pain has been constant for the past 2-3 weeks.) and Abdominal Pain (Pt has been having abdominal pain and nausea since November. This was their main complaint today- States he was very stressed at school and is concerned about stomach ulcers.)     Subjective:     Presents with mom for car visit due to COVID-19 concerns and screening guidelines. Chief complaint reviewed. C/o upper resp symptoms as indicated in ROS x 2 days, but c/o upper abd pain, headaches (pressure behind eyes), and nausea x approx 2 mths. Stressed at school - tough classes at [employer] last semester. Started taking prilosec OTC 3 days ago - has appt here 22 for these complaints.    Review of Systems:    Review of Systems   Constitutional: Negative for appetite change, chills, fatigue and fever.   HENT: Positive for congestion, ear pain and postnasal drip. Negative for sinus pain and sore throat.    Respiratory: Positive for cough. Negative for shortness of breath and wheezing.    Cardiovascular: Negative for chest pain.   Gastrointestinal: Positive for abdominal pain, diarrhea and nausea. Negative for vomiting.   Musculoskeletal: Negative for myalgias.   Skin: Negative.    Neurological: Positive for headaches.        Review of patient's allergies indicates:  No Known Allergies     Med List:  No current outpatient medications on file prior to visit.     No current facility-administered medications on file prior to visit.       Medical/Social/Family History:  Past Medical History:   Diagnosis Date    COVID-19 2022    GERD (gastroesophageal  "reflux disease)       Social History     Tobacco Use   Smoking Status Never Smoker   Smokeless Tobacco Never Used      Immunization History   Administered Date(s) Administered    DTaP 02/25/1999, 05/04/1999, 07/07/1999, 02/12/2003    DTaP / HiB 03/24/2000    Hep B / HiB 05/04/1999    Hepatitis B, Pediatric/Adolescent 02/25/1999, 07/07/1999    HiB PRP-OMP 02/25/1999    IPV 02/25/1999, 05/04/1999, 03/24/2000, 02/12/2003    MMR 03/24/2000, 02/12/2003    Meningococcal Conjugate (MCV4P) 10/19/2018    Tdap 10/19/2018    Varicella 03/24/2000        Objective:      Vitals:    01/20/22 1153   BP: 118/74   BP Location: Right arm   Patient Position: Sitting   BP Method: Large (Manual)   Pulse: 87   Resp: 20   SpO2: 98%   Weight: 93 kg (205 lb)  Comment: Pt estimated   Height: 6' 2" (1.88 m)     Body mass index is 26.32 kg/m².     Physical Exam:    Physical Exam  Vitals and nursing note reviewed.   Constitutional:       General: He is not in acute distress.     Appearance: Normal appearance. He is not ill-appearing.   HENT:      Head: Normocephalic.   Eyes:      Conjunctiva/sclera: Conjunctivae normal.   Cardiovascular:      Rate and Rhythm: Normal rate.   Pulmonary:      Effort: Pulmonary effort is normal. No respiratory distress.   Abdominal:      Tenderness: There is no abdominal tenderness.   Skin:     Coloration: Skin is not pale.   Neurological:      Mental Status: He is alert and oriented to person, place, and time.         Assessment:          ICD-10-CM ICD-9-CM   1. COVID-19  U07.1 079.89   2. Nonintractable headache, unspecified chronicity pattern, unspecified headache type  R51.9 784.0   3. Stomach pain  R10.9 536.8   4. Nausea  R11.0 787.02   5. Upper abdominal pain  R10.10 789.09   6. Persistent headaches  R51.9 784.0        Plan:       COVID-19    Nonintractable headache, unspecified chronicity pattern, unspecified headache type  -     SARS Coronavirus 2 Antigen, POCT    Stomach pain  -     SARS " Coronavirus 2 Antigen, POCT    Nausea  -     SARS Coronavirus 2 Antigen, POCT    Upper abdominal pain    Persistent headaches        Current Outpatient Medications:     amitriptyline (ELAVIL) 25 MG tablet, Take 1 tablet (25 mg total) by mouth every evening., Disp: 90 tablet, Rfl: 0    omeprazole (PRILOSEC) 20 MG capsule, Take 20 mg by mouth once daily., Disp: , Rfl:       Requested Prescriptions      No prescriptions requested or ordered in this encounter       Rapid COVID POSITIVE     Advised to quarantine per updated CDC guidelines.  Advised COVID-19 is a virus and viruses do not respond to antibiotics nor do antibiotics prevent a bacterial infection from developing.   Advised there is still some delta variant COVID-19 circulating, but the Omicron variant seems to be rapidly increasing in the area and tends to have milder symptoms that are shorter in duration.     Advised of the nationwide shortage of COVID MABs, and the very limited supply for very high risk patient's only.   Risk of COVID-19 Complications score: 1     Treatment for COVID-19 is primarily supportive including rest, increase fluids, and the use of OTC meds to help alleviate symptoms.    For immune support take vitamin C, vitamin D, and zinc 50 mg daily.  Acetaminophen or ibuprofen as needed for body aches, headache, or fever.  Home O2 sat monitoring can be used if experiencing shortness of breath.   Advised to report to ED for evaluation of any new or worsening chest pain, shortness of breath, or other severe symptoms.  Call the office if you have other questions or concerns that arise.    Stay on prilosec OTC and keep appt 2/4/22 as scheduled.  F/u as needed or if symptoms worsen or persist.    Signature: Yulia Schwab FNP-B    Reviewed 03/11/2022 by LYNDON Estrada MD

## 2022-05-04 ENCOUNTER — OFFICE VISIT (OUTPATIENT)
Dept: FAMILY MEDICINE | Facility: CLINIC | Age: 22
End: 2022-05-04
Payer: COMMERCIAL

## 2022-05-04 VITALS
HEART RATE: 78 BPM | OXYGEN SATURATION: 99 % | TEMPERATURE: 99 F | WEIGHT: 214.19 LBS | BODY MASS INDEX: 27.49 KG/M2 | RESPIRATION RATE: 20 BRPM | HEIGHT: 74 IN | DIASTOLIC BLOOD PRESSURE: 76 MMHG | SYSTOLIC BLOOD PRESSURE: 122 MMHG

## 2022-05-04 DIAGNOSIS — R10.10 UPPER ABDOMINAL PAIN: ICD-10-CM

## 2022-05-04 DIAGNOSIS — R51.9 PERSISTENT HEADACHES: Primary | ICD-10-CM

## 2022-05-04 PROCEDURE — 3008F PR BODY MASS INDEX (BMI) DOCUMENTED: ICD-10-PCS | Mod: ,,, | Performed by: NURSE PRACTITIONER

## 2022-05-04 PROCEDURE — 3078F PR MOST RECENT DIASTOLIC BLOOD PRESSURE < 80 MM HG: ICD-10-PCS | Mod: ,,, | Performed by: NURSE PRACTITIONER

## 2022-05-04 PROCEDURE — 3078F DIAST BP <80 MM HG: CPT | Mod: ,,, | Performed by: NURSE PRACTITIONER

## 2022-05-04 PROCEDURE — 1160F PR REVIEW ALL MEDS BY PRESCRIBER/CLIN PHARMACIST DOCUMENTED: ICD-10-PCS | Mod: ,,, | Performed by: NURSE PRACTITIONER

## 2022-05-04 PROCEDURE — 1160F RVW MEDS BY RX/DR IN RCRD: CPT | Mod: ,,, | Performed by: NURSE PRACTITIONER

## 2022-05-04 PROCEDURE — 1159F MED LIST DOCD IN RCRD: CPT | Mod: ,,, | Performed by: NURSE PRACTITIONER

## 2022-05-04 PROCEDURE — 1159F PR MEDICATION LIST DOCUMENTED IN MEDICAL RECORD: ICD-10-PCS | Mod: ,,, | Performed by: NURSE PRACTITIONER

## 2022-05-04 PROCEDURE — 3074F PR MOST RECENT SYSTOLIC BLOOD PRESSURE < 130 MM HG: ICD-10-PCS | Mod: ,,, | Performed by: NURSE PRACTITIONER

## 2022-05-04 PROCEDURE — 99213 OFFICE O/P EST LOW 20 MIN: CPT | Mod: ,,, | Performed by: NURSE PRACTITIONER

## 2022-05-04 PROCEDURE — 3008F BODY MASS INDEX DOCD: CPT | Mod: ,,, | Performed by: NURSE PRACTITIONER

## 2022-05-04 PROCEDURE — 99213 PR OFFICE/OUTPT VISIT, EST, LEVL III, 20-29 MIN: ICD-10-PCS | Mod: ,,, | Performed by: NURSE PRACTITIONER

## 2022-05-04 PROCEDURE — 3074F SYST BP LT 130 MM HG: CPT | Mod: ,,, | Performed by: NURSE PRACTITIONER

## 2022-05-04 RX ORDER — AMITRIPTYLINE HYDROCHLORIDE 50 MG/1
50 TABLET, FILM COATED ORAL NIGHTLY
Qty: 90 TABLET | Refills: 1 | Status: SHIPPED | OUTPATIENT
Start: 2022-05-04 | End: 2022-05-04

## 2022-05-04 RX ORDER — OMEPRAZOLE 40 MG/1
40 CAPSULE, DELAYED RELEASE ORAL DAILY
COMMUNITY
Start: 2022-04-07

## 2022-05-04 NOTE — PROGRESS NOTES
[facility] - FAMILY MEDICINE       PATIENT NAME: Amado Martins   : 2000    AGE: 21 y.o. DATE: 2022    MRN: [id number]        Reason for Visit / Chief Complaint:  Follow-up (Pt presents for 8 week headache follow up. He states the med worked great for about two weeks. After two weeks, the headaches come back but they have not been as bad. Last headache was yesterday. He denies any other sxs.) and Headache     Subjective:     HPI:  Presents for 8 week f/u chronic headaches. Initial improvement with amitriptyline then effects waned. Stopped taking amitriptyline 2 weeks ago due to causing irritability and grogginess.   Headaches are more manageable since starting exercise.  Yesterday after working out hurts to raise RUE to front; shoulders have always grinded.     Saw GI at [facility] [place], [place] and omeprazole was increased to 40 mg and scheduled EGD in August. Can tell much difference.     PHQ9 2022   Total Score 0       Review of Systems:     Review of Systems   Constitutional: Negative.    HENT: Negative.    Respiratory: Negative.    Cardiovascular: Negative.    Gastrointestinal: Positive for abdominal pain. Negative for blood in stool, constipation, diarrhea, nausea and vomiting.   Genitourinary: Negative.    Musculoskeletal: Positive for arthralgias.   Skin: Negative.    Neurological: Positive for headaches.   Psychiatric/Behavioral: Negative.  Negative for dysphoric mood. The patient is not nervous/anxious.        Allergies and Medications:     Review of patient's allergies indicates:  No Known Allergies     Current Outpatient Medications on File Prior to Visit   Medication Sig Dispense Refill    omeprazole (PRILOSEC) 40 MG capsule Take 40 mg by mouth once daily.      [DISCONTINUED] amitriptyline (ELAVIL) 25 MG tablet Take 1 tablet (25 mg total) by mouth every evening. 90 tablet 0    [DISCONTINUED] omeprazole (PRILOSEC) 20 MG  capsule Take 20 mg by mouth once daily.       No current facility-administered medications on file prior to visit.       Labs:     Lipids:   Lab Results   Component Value Date    CHOL 193 02/10/2022     Lab Results   Component Value Date    HDL 46 02/10/2022     Lab Results   Component Value Date    LDLCALC 122 02/10/2022     Lab Results   Component Value Date    TRIG 123 02/10/2022     Lab Results   Component Value Date    CHOLHDL 4.2 02/10/2022      CMP:  Sodium   Date Value Ref Range Status   02/10/2022 141 136 - 145 mmol/L Final     Potassium   Date Value Ref Range Status   02/10/2022 4.1 3.5 - 5.1 mmol/L Final     Chloride   Date Value Ref Range Status   02/10/2022 106 98 - 107 mmol/L Final     CO2   Date Value Ref Range Status   02/10/2022 29 21 - 32 mmol/L Final     Glucose   Date Value Ref Range Status   02/10/2022 93 74 - 106 mg/dL Final     BUN   Date Value Ref Range Status   02/10/2022 14 7 - 18 mg/dL Final     Creatinine   Date Value Ref Range Status   02/10/2022 0.83 0.70 - 1.30 mg/dL Final     Calcium   Date Value Ref Range Status   02/10/2022 9.0 8.5 - 10.1 mg/dL Final     Total Protein   Date Value Ref Range Status   02/10/2022 7.3 6.4 - 8.2 g/dL Final     Albumin   Date Value Ref Range Status   02/10/2022 4.2 3.5 - 5.0 g/dL Final     Bilirubin, Total   Date Value Ref Range Status   02/10/2022 0.7 0.0 - 1.2 mg/dL Final     Alk Phos   Date Value Ref Range Status   02/10/2022 44 (L) 45 - 115 U/L Final     AST   Date Value Ref Range Status   02/10/2022 14 (L) 15 - 37 U/L Final     ALT   Date Value Ref Range Status   02/10/2022 27 16 - 61 U/L Final     Anion Gap   Date Value Ref Range Status   02/10/2022 10 7 - 16 mmol/L Final     eGFR   Date Value Ref Range Status   02/10/2022 122 >=60 mL/min/1.73m² Final      CBC:  Lab Results   Component Value Date    WBC 4.65 02/10/2022    RBC 5.13 02/10/2022    HGB 15.4 02/10/2022    HCT 45.5 02/10/2022    MCV 88.7 02/10/2022    MCH 30.0 02/10/2022    MCHC 33.8  02/10/2022    RDW 12.4 02/10/2022     02/10/2022    MPV 11.9 02/10/2022    LYMPH 42.8 (H) 02/10/2022    LYMPH 1.99 02/10/2022    MONO 10.5 (H) 02/10/2022    EOS 0.04 02/10/2022    BASO 0.01 02/10/2022    EOSINOPHIL 0.9 (L) 02/10/2022    BASOPHIL 0.2 02/10/2022      Lab Results   Component Value Date    TSH 2.390 02/10/2022       Medical/Social/Family History:     Past Medical History:   Diagnosis Date    COVID-19 1/20/2022    GERD (gastroesophageal reflux disease)       Social History     Tobacco Use   Smoking Status Never Smoker   Smokeless Tobacco Never Used      Social History     Substance and Sexual Activity   Alcohol Use Yes       Family History   Problem Relation Age of Onset    No Known Problems Mother     GI problems Father     No Known Problems Maternal Grandmother     No Known Problems Maternal Grandfather     No Known Problems Paternal Grandmother     No Known Problems Paternal Grandfather       Past Surgical History:   Procedure Laterality Date    BONE GRAFT      TONSILLECTOMY          Health Maintenance:     Immunization History   Administered Date(s) Administered    DTaP 02/25/1999, 05/04/1999, 07/07/1999, 02/12/2003    DTaP / HiB 03/24/2000    Hep B / HiB 05/04/1999    Hepatitis B, Pediatric/Adolescent 02/25/1999, 07/07/1999    HiB PRP-OMP 02/25/1999    IPV 02/25/1999, 05/04/1999, 03/24/2000, 02/12/2003    MMR 03/24/2000, 02/12/2003    Meningococcal Conjugate (MCV4P) 10/19/2018    Tdap 10/19/2018    Varicella 03/24/2000      Health Maintenance Due   Topic Date Due    Eye Exam  Never done     Health Maintenance Topics with due status: Not Due       Topic Last Completion Date    TETANUS VACCINE 10/19/2018       Objective:      Wt Readings from Last 3 Encounters:   05/04/22 1125 97.2 kg (214 lb 3.2 oz)   02/15/22 1319 95 kg (209 lb 6.4 oz)   02/08/22 1349 94.7 kg (208 lb 12.8 oz)     Vitals:    05/04/22 1125   BP: 122/76   BP Location: Left arm   Patient Position: Sitting  "  BP Method: Large (Manual)   Pulse: 78   Resp: 20   Temp: 98.6 °F (37 °C)   TempSrc: Temporal   SpO2: 99%   Weight: 97.2 kg (214 lb 3.2 oz)   Height: 6' 2" (1.88 m)     Body mass index is 27.5 kg/m².     Physical Exam:    Physical Exam  Vitals and nursing note reviewed.   Constitutional:       Appearance: Normal appearance.   Cardiovascular:      Rate and Rhythm: Normal rate and regular rhythm.      Heart sounds: Normal heart sounds.   Pulmonary:      Effort: Pulmonary effort is normal.      Breath sounds: Normal breath sounds.   Musculoskeletal:      Right shoulder: Normal range of motion (pain with forward flexion).      Comments: Poor posture with shoulders slumped forward.   Skin:     General: Skin is warm and dry.   Neurological:      Mental Status: He is alert and oriented to person, place, and time.          Assessment:          ICD-10-CM ICD-9-CM   1. Persistent headaches  R51.9 784.0   2. Upper abdominal pain  R10.10 789.09        Plan:       Persistent headaches    Upper abdominal pain    Other orders  -     Discontinue: amitriptyline (ELAVIL) 50 MG tablet; Take 1 tablet (50 mg total) by mouth every evening.  Dispense: 90 tablet; Refill: 1        Current Outpatient Medications:     omeprazole (PRILOSEC) 40 MG capsule, Take 40 mg by mouth once daily., Disp: , Rfl:       New & refilled meds:  Requested Prescriptions      No prescriptions requested or ordered in this encounter     Declines HPV vaccine.  Wants to hold off on HIDA scan. Discussed trying natural tx for upper abd pain and continue f/u GI.   DC amitriptyline - pt already stopped it 2 weeks ago.  Wants to hold off on other treatment for ha now.  Healthy diet, exercise, wt loss    Return to clinic Feb 2023 for wellness as scheduled; and f/u as needed.    Future Appointments   Date Time Provider Department Center   2/13/2023  8:00 AM KRYSTAL Powers [facility] TAMMIE Peterson        Signature:  KRYSTAL Powers " [facility] PRIMARY CARE - FAMILY MEDICINE    Date of encounter: 5/4/22